# Patient Record
Sex: FEMALE | Race: WHITE
[De-identification: names, ages, dates, MRNs, and addresses within clinical notes are randomized per-mention and may not be internally consistent; named-entity substitution may affect disease eponyms.]

---

## 2019-09-22 ENCOUNTER — HOSPITAL ENCOUNTER (INPATIENT)
Dept: HOSPITAL 56 - MW.OBCHECK | Age: 24
LOS: 3 days | Discharge: HOME | DRG: 560 | End: 2019-09-25
Attending: OBSTETRICS & GYNECOLOGY | Admitting: OBSTETRICS & GYNECOLOGY
Payer: COMMERCIAL

## 2019-09-22 DIAGNOSIS — Z3A.38: ICD-10-CM

## 2019-09-23 PROCEDURE — 0HQ9XZZ REPAIR PERINEUM SKIN, EXTERNAL APPROACH: ICD-10-PCS | Performed by: OBSTETRICS & GYNECOLOGY

## 2019-09-23 NOTE — PCM.PNPP
- General Info


Date of Service: 19


Functional Status: Reports: Pain Controlled, Tolerating Diet, Ambulating, 

Urinating





- Review of Systems


General: Reports: Fatigue.  Denies: Fever, Weakness


Pulmonary: Denies: Shortness of Breath


Cardiovascular: Denies: Chest Pain, Palpitations, Lightheadedness


Gastrointestinal: Denies: Abdominal Pain, Nausea, Vomiting


Genitourinary: Denies: Flank Pain


Musculoskeletal: Reports: No Symptoms


Skin: Reports: No Symptoms


Neurological: Reports: No Symptoms


Psychiatric: Reports: No Symptoms





- General Info


Date of Service: 19





- Patient Data


Vital Signs - Most Recent: 


 Last Vital Signs











Temp  36.7 C   19 07:55


 


Pulse  83   19 07:55


 


Resp  16   19 07:55


 


BP  113/64   19 07:55


 


Pulse Ox  96   19 07:55











Weight - Most Recent: 72.575 kg


Lab Results - Last 24 Hours: 


 Laboratory Results - last 24 hr











  19 Range/Units





  00:50 00:50 


 


WBC  14.23 H   (4.0-11.0)  K/uL


 


RBC  4.18 L   (4.30-5.90)  M/uL


 


Hgb  13.7   (12.0-16.0)  g/dL


 


Hct  39.5   (36.0-46.0)  %


 


MCV  94.5   (80.0-98.0)  fL


 


MCH  32.8 H   (27.0-32.0)  pg


 


MCHC  34.7   (31.0-37.0)  g/dL


 


RDW Std Deviation  43.5   (28.0-62.0)  fl


 


RDW Coeff of Clive  13   (11.0-15.0)  %


 


Plt Count  281   (150-400)  K/uL


 


MPV  11.00   (7.40-12.00)  fL


 


Neut % (Auto)  79.1   (48.0-80.0)  %


 


Lymph % (Auto)  12.5 L   (16.0-40.0)  %


 


Mono % (Auto)  7.2   (0.0-15.0)  %


 


Eos % (Auto)  1.1   (0.0-7.0)  %


 


Baso % (Auto)  0.1   (0.0-1.5)  %


 


Neut # (Auto)  11.3 H   (1.4-5.7)  K/uL


 


Lymph # (Auto)  1.8   (0.6-2.4)  K/uL


 


Mono # (Auto)  1.0 H   (0.0-0.8)  K/uL


 


Eos # (Auto)  0.2   (0.0-0.7)  K/uL


 


Baso # (Auto)  0.0   (0.0-0.1)  K/uL


 


Blood Type   O POSITIVE  


 


Antibody Screen   NEGATIVE  











Med Orders - Current: 


 Current Medications





Acetaminophen (Tylenol Extra Strength)  500 mg PO Q4H PRN


   PRN Reason: Pain


Acetaminophen (Tylenol Extra Strength)  1,000 mg PO Q4H PRN


   PRN Reason: Pain


   Last Admin: 19 10:14 Dose:  1,000 mg


Benzocaine/Menthol (Dermoplast Pain Relief 20%-0.5% Spray)  78 gm TOP 

ASDIRECTED PRN


   PRN Reason: Perineal Comfort Measure


   Last Admin: 19 10:15 Dose:  1 can


Bisacodyl (Dulcolax)  10 mg RECTAL ONETIME PRN


   PRN Reason: Constipation


Docusate Sodium (Colace)  100 mg PO BID PRN


   PRN Reason: Constipation


Emollient Ointment (Lansinoh Hpa)  0 gm TOP ASDIRECTED PRN


   PRN Reason: Sore Nipples


   Last Admin: 19 10:15 Dose:  1 tube


Ibuprofen (Motrin)  400 mg PO Q4H PRN


   PRN Reason: Pain


Ibuprofen (Motrin)  800 mg PO Q6H PRN


   PRN Reason: Pain


   Last Admin: 19 01:28 Dose:  800 mg


Witch Hazel (Tucks)  1 pad TOP ASDIRECTED PRN


   PRN Reason: comfort care


   Last Admin: 19 10:15 Dose:  1 tub





Discontinued Medications





Lidocaine HCl (Xylocaine 1%)  20 ml INJECT ONETIME ONE


   Stop: 19 00:37


   Last Admin: 19 07:35 Dose:  Not Given


Measles/Mumps/Rubella Vaccine Live (M-M-R Ii Vaccine)  0.5 ml SUBCUT .ONCE ONE


   Stop: 19 00:30











- Infant Interaction


Support Person: Significant Other





- Exam


General: Alert, Oriented


Lungs: Normal Respiratory Effort


Cardiovascular: Regular Rate, Regular Rhythm


GI/Abdominal Exam: Normal Bowel Sounds, Soft


Extremities: Pedal Edema (trace).  No: Merary's Sign


Skin: Warm, Dry, Intact


Neurological: No New Focal Deficit


Psy/Mental Status: Alert, Normal Affect, Normal Mood





- Problem List & Annotations


(1) Vaginal delivery


SNOMED Code(s): 757120848


   Code(s): O80 - ENCOUNTER FOR FULL-TERM UNCOMPLICATED DELIVERY   Status: 

Acute   Current Visit: No   





- Problem List Review


Problem List Initiated/Reviewed/Updated: Yes





- Assessment


Assessment:: 





PPD 1 status post 





- Plan


Plan:: 


Patient is doing well overall, continue PP cares

## 2019-09-23 NOTE — PCM.DEL
L & D Note





- General Info


Date of Service: 19


Mother's Due Date: 19





- Delivery Note


Labor: Spontaneous


Delivery Outcome: Livebirth


Birth Presentation: Vertex


Anesthesia Type: Local


Anesthetic: Lidocaine (Xylocaine) 1% Plain


Local Anesthetic Volume: 5cc


Amniotic Fluid Description: Meconium Stained


Episiotomy Type: None


Laceration: 1st Degree


Suture type: Vicryl


Suture size: 3-0


Placenta: Intact, Spontaneous


Cord: 3 Vessels


Estimated Blood Loss: 300


Resuscitation Needed: No


: Suctioned


 Provider: Saranya Tavarez


APGAR Score 1 min: 9


APGAR Score 5 min: 9


Delivery Comments (Free Text/Narrative):: 





25yo  presenting in spontaneous labor and SROM. Had precipitous delivery 

in the hospital lobby. Per nursing report, fetus cephalic and delivery 

uncomplicated. Baby was cry and pink, moving all extremities right after 

delivery. Assessed by nursery team. Cord clamped and cut. After I arrived, 

mother was doing well and holding baby. Placenta delivered with gentle traction 

on the umbilical cord, examined to be intact. Fundus firm and below the 

umbilicus, bleeding is minimal. 1st degree perineal laceration noted. 1% 

lidocaine used for local anesthesia. Laceration repaired with 3-0 vicryl in 

usual fashion. Hemostasis was confirmed. Patient tolerated procedure well, 

given postpartum care instructions. 





- General Info


Date of Service: 19





- Patient Data


Med Orders - Current: 


 Current Medications





Acetaminophen (Tylenol Extra Strength)  500 mg PO Q4H PRN


   PRN Reason: Pain


Acetaminophen (Tylenol Extra Strength)  1,000 mg PO Q4H PRN


   PRN Reason: Pain


Benzocaine/Menthol (Dermoplast Pain Relief 20%-0.5% Spray)  78 gm TOP 

ASDIRECTED PRN


   PRN Reason: Perineal Comfort Measure


Bisacodyl (Dulcolax)  10 mg RECTAL ONETIME PRN


   PRN Reason: Constipation


Docusate Sodium (Colace)  100 mg PO BID PRN


   PRN Reason: Constipation


Emollient Ointment (Lansinoh Hpa)  0 gm TOP ASDIRECTED PRN


   PRN Reason: Sore Nipples


Ibuprofen (Motrin)  400 mg PO Q4H PRN


   PRN Reason: Pain


Ibuprofen (Motrin)  800 mg PO Q6H PRN


   PRN Reason: Pain


Lidocaine HCl (Xylocaine 1%)  20 ml INJECT ONETIME ONE


   Stop: 19 00:37


Witch Hazel (Tucks)  1 pad TOP ASDIRECTED PRN


   PRN Reason: comfort care





Discontinued Medications





Measles/Mumps/Rubella Vaccine Live (M-M-R Ii Vaccine)  0.5 ml SUBCUT .ONCE ONE


   Stop: 19 00:30











- Problem List Review


Problem List Initiated/Reviewed/Updated: Yes





- My Orders


Last 24 Hours: 


My Active Orders





19 00:15


CBC WITH AUTO DIFF [HEME] Routine 


TYPE AND SCREEN [BBK] Routine 





19 00:29


Patient Status [ADT] Routine 


May Shower [RC] ASDIRECTED 


Up ad Dorita [RC] ASDIRECTED 


Vital Signs [RC] PER UNIT ROUTINE 


Acetaminophen [Tylenol Extra Strength]   1,000 mg PO Q4H PRN 


Acetaminophen [Tylenol Extra Strength]   500 mg PO Q4H PRN 


Benzocaine/Menthol [Dermoplast Pain Relief 20%-0.5% Spray]   78 gm TOP 

ASDIRECTED PRN 


Bisacodyl [Dulcolax]   10 mg RECTAL ONETIME PRN 


Docusate Sodium [Colace]   100 mg PO BID PRN 


Ibuprofen [Motrin]   400 mg PO Q4H PRN 


Ibuprofen [Motrin]   800 mg PO Q6H PRN 


Lanolin [Lansinoh HPA]   See Dose Instructions  TOP ASDIRECTED PRN 


Witch Hazel [Tucks]   1 pad TOP ASDIRECTED PRN 


Assess Lochia [WOMSER] Per Unit Routine 


Assess Uterine Involution [WOMSER] Per Unit Routine 


Breast Pump [WOMSER] Per Unit Routine 


Peripheral IV Discontinue [OM.PC] Routine 


Resuscitation Status Routine 





19 00:31


Ice Therapy [OM.PC] Per Unit Routine 


Perineal Care [OM.PC] Per Unit Routine 


Sitz Bath [OM.PC] Per Unit Routine 





19 00:36


Lidocaine 1% [Xylocaine 1%]   20 ml INJECT ONETIME ONE 





19 05:11


HEMOGLOBIN/HEMATOCRIT,HH [HEME] Timed

## 2019-09-24 NOTE — PCM.PNPP
- General Info


Date of Service: 19


Functional Status: Reports: Pain Controlled, Tolerating Diet, Ambulating, 

Urinating





- Review of Systems


General: Reports: Fatigue.  Denies: Fever, Weakness


Pulmonary: Denies: Shortness of Breath


Cardiovascular: Denies: Chest Pain, Palpitations, Lightheadedness


Gastrointestinal: Denies: Abdominal Pain, Nausea, Vomiting


Genitourinary: Denies: Flank Pain


Skin: Reports: No Symptoms


Neurological: Reports: No Symptoms


Psychiatric: Reports: No Symptoms





- General Info


Date of Service: 19





- Patient Data


Vital Signs - Most Recent: 


 Last Vital Signs











Temp  36.2 C   19 07:18


 


Pulse  69   19 07:18


 


Resp  16   19 07:18


 


BP  115/69   19 07:49


 


Pulse Ox  97   19 07:18











Weight - Most Recent: 72.575 kg


Lab Results - Last 24 Hours: 


 Laboratory Results - last 24 hr











  19 Range/Units





  06:02 


 


Hgb  13.0  (12.0-16.0)  g/dL


 


Hct  39.0  (36.0-46.0)  %











Med Orders - Current: 


 Current Medications





Acetaminophen (Tylenol Extra Strength)  500 mg PO Q4H PRN


   PRN Reason: Pain


Acetaminophen (Tylenol Extra Strength)  1,000 mg PO Q4H PRN


   PRN Reason: Pain


   Last Admin: 19 01:21 Dose:  1,000 mg


Benzocaine/Menthol (Dermoplast Pain Relief 20%-0.5% Spray)  78 gm TOP 

ASDIRECTED PRN


   PRN Reason: Perineal Comfort Measure


   Last Admin: 19 10:15 Dose:  1 can


Bisacodyl (Dulcolax)  10 mg RECTAL ONETIME PRN


   PRN Reason: Constipation


Docusate Sodium (Colace)  100 mg PO BID PRN


   PRN Reason: Constipation


Emollient Ointment (Lansinoh Hpa)  0 gm TOP ASDIRECTED PRN


   PRN Reason: Sore Nipples


   Last Admin: 19 10:15 Dose:  1 tube


Ibuprofen (Motrin)  400 mg PO Q4H PRN


   PRN Reason: Pain


Ibuprofen (Motrin)  800 mg PO Q6H PRN


   PRN Reason: Pain


   Last Admin: 19 15:54 Dose:  800 mg


Witch Hazel (Tucks)  1 pad TOP ASDIRECTED PRN


   PRN Reason: comfort care


   Last Admin: 19 10:15 Dose:  1 tub





Discontinued Medications





Lidocaine HCl (Xylocaine 1%)  20 ml INJECT ONETIME ONE


   Stop: 19 00:37


   Last Admin: 19 07:35 Dose:  Not Given


Measles/Mumps/Rubella Vaccine Live (M-M-R Ii Vaccine)  0.5 ml SUBCUT .ONCE ONE


   Stop: 19 00:30











- Infant Interaction


Support Person: Significant Other





- Postpartum Recovery Exam


Fundal Tone: Firm


Fundal Level: 2 Fingerbreadths Below Umbilicus


Fundal Placement: Midline


Lochia Amount: Scant


Lochia Color: Rubra/Red


Perineum Description: Intact, Minimal Bruising/Swelling


Other Perinuem Description: 1 degree laceration


Episiotomy/Laceration: Approximated


Bladder Status: Voiding


Urinary Elimination: Voided





- Exam


General: Alert, Oriented


Lungs: Normal Respiratory Effort


Cardiovascular: Regular Rate, Regular Rhythm


GI/Abdominal Exam: Normal Bowel Sounds, Soft.  No: Guarding, Rigid


Extremities: Pedal Edema (trace).  No: Merary's Sign


Skin: Warm, Dry, Intact


Neurological: No New Focal Deficit


Psy/Mental Status: Alert, Normal Affect





- Problem List & Annotations


(1) Vaginal delivery


SNOMED Code(s): 891476975


   Code(s): O80 - ENCOUNTER FOR FULL-TERM UNCOMPLICATED DELIVERY   Status: 

Acute   Current Visit: No   





- Problem List Review


Problem List Initiated/Reviewed/Updated: Yes





- Assessment


Assessment:: 





PPD 2 status post 





- Plan


Plan:: 


Doing well overall, continue PP cares.  Patient would like to go home, but pedi 

may want to monitor baby until tomorrow given SGA (5 lb 14 oz at birth).  

Discharge instructions reviewed.

## 2019-09-25 VITALS — DIASTOLIC BLOOD PRESSURE: 64 MMHG | SYSTOLIC BLOOD PRESSURE: 113 MMHG | HEART RATE: 94 BPM

## 2019-09-25 NOTE — PCM.PNPP
- General Info


Date of Service: 19


Functional Status: Reports: Pain Controlled, Tolerating Diet, Ambulating, 

Urinating





- Review of Systems


General: Denies: Fever, Weakness, Fatigue


Pulmonary: Denies: Shortness of Breath


Cardiovascular: Denies: Chest Pain, Palpitations, Lightheadedness


Gastrointestinal: Reports: Abdominal Pain.  Denies: Nausea, Vomiting


Genitourinary: Denies: Flank Pain


Musculoskeletal: Reports: No Symptoms


Skin: Reports: No Symptoms


Neurological: Reports: No Symptoms


Psychiatric: Reports: No Symptoms





- General Info


Date of Service: 19





- Patient Data


Vital Signs - Most Recent: 


 Last Vital Signs











Temp  36.8 C   19 04:22


 


Pulse  86   19 04:22


 


Resp  16   19 04:22


 


BP  122/69   19 04:22


 


Pulse Ox  96   19 04:22











Weight - Most Recent: 72.575 kg


Med Orders - Current: 


 Current Medications





Acetaminophen (Tylenol Extra Strength)  500 mg PO Q4H PRN


   PRN Reason: Pain


Acetaminophen (Tylenol Extra Strength)  1,000 mg PO Q4H PRN


   PRN Reason: Pain


   Last Admin: 19 01:21 Dose:  1,000 mg


Benzocaine/Menthol (Dermoplast Pain Relief 20%-0.5% Spray)  78 gm TOP 

ASDIRECTED PRN


   PRN Reason: Perineal Comfort Measure


   Last Admin: 19 10:15 Dose:  1 can


Bisacodyl (Dulcolax)  10 mg RECTAL ONETIME PRN


   PRN Reason: Constipation


Docusate Sodium (Colace)  100 mg PO BID PRN


   PRN Reason: Constipation


Emollient Ointment (Lansinoh Hpa)  0 gm TOP ASDIRECTED PRN


   PRN Reason: Sore Nipples


   Last Admin: 19 10:15 Dose:  1 tube


Ibuprofen (Motrin)  400 mg PO Q4H PRN


   PRN Reason: Pain


Ibuprofen (Motrin)  800 mg PO Q6H PRN


   PRN Reason: Pain


   Last Admin: 19 15:54 Dose:  800 mg


Witch Hazel (Tucks)  1 pad TOP ASDIRECTED PRN


   PRN Reason: comfort care


   Last Admin: 19 10:15 Dose:  1 tub





Discontinued Medications





Lidocaine HCl (Xylocaine 1%)  20 ml INJECT ONETIME ONE


   Stop: 19 00:37


   Last Admin: 19 07:35 Dose:  Not Given


Measles/Mumps/Rubella Vaccine Live (M-M-R Ii Vaccine)  0.5 ml SUBCUT .ONCE ONE


   Stop: 19 00:30











- Infant Interaction


Support Person: Significant Other





- Postpartum Recovery Exam


Fundal Tone: Firm


Fundal Level: 2 Fingerbreadths Below Umbilicus


Fundal Placement: Midline


Lochia Amount: Scant


Lochia Color: Rubra/Red


Perineum Description: Intact, Minimal Bruising/Swelling, Edematous


Other Perinuem Description: 1 degree laceration


Episiotomy/Laceration: Approximated


Bladder Status: Voiding


Urinary Elimination: Voided





- Exam


General: Alert, Oriented


Lungs: Clear to Auscultation, Normal Respiratory Effort


Cardiovascular: Regular Rate, Regular Rhythm


GI/Abdominal Exam: Normal Bowel Sounds, Soft


Extremities: Pedal Edema (trace).  No: Merary's Sign


Skin: Warm, Dry, Intact


Neurological: No New Focal Deficit


Psy/Mental Status: Alert, Normal Affect, Normal Mood





- Problem List & Annotations


(1) Vaginal delivery


SNOMED Code(s): 413984692


   Code(s): O80 - ENCOUNTER FOR FULL-TERM UNCOMPLICATED DELIVERY   Status: 

Acute   Current Visit: No   





- Problem List Review


Problem List Initiated/Reviewed/Updated: Yes





- My Orders


Last 24 Hours: 


My Active Orders





19 08:44


Ready for Discharge [RC] PER UNIT ROUTINE 














- Assessment


Assessment:: 





PPD 3 status post 





- Plan


Plan:: 


Discharge to home today. Discharge instructions reviewed.